# Patient Record
Sex: MALE | Race: WHITE | NOT HISPANIC OR LATINO | Employment: FULL TIME | URBAN - METROPOLITAN AREA
[De-identification: names, ages, dates, MRNs, and addresses within clinical notes are randomized per-mention and may not be internally consistent; named-entity substitution may affect disease eponyms.]

---

## 2019-09-24 ENCOUNTER — HOSPITAL ENCOUNTER (EMERGENCY)
Facility: MEDICAL CENTER | Age: 34
End: 2019-09-24
Attending: EMERGENCY MEDICINE
Payer: COMMERCIAL

## 2019-09-24 ENCOUNTER — APPOINTMENT (OUTPATIENT)
Dept: RADIOLOGY | Facility: MEDICAL CENTER | Age: 34
End: 2019-09-24
Attending: EMERGENCY MEDICINE
Payer: COMMERCIAL

## 2019-09-24 VITALS
HEART RATE: 64 BPM | RESPIRATION RATE: 18 BRPM | HEIGHT: 74 IN | OXYGEN SATURATION: 98 % | BODY MASS INDEX: 23.37 KG/M2 | TEMPERATURE: 97.5 F | WEIGHT: 182.1 LBS | DIASTOLIC BLOOD PRESSURE: 70 MMHG | SYSTOLIC BLOOD PRESSURE: 118 MMHG

## 2019-09-24 DIAGNOSIS — S09.90XA CLOSED HEAD INJURY, INITIAL ENCOUNTER: ICD-10-CM

## 2019-09-24 DIAGNOSIS — S02.40EA ZYGOMATIC FRACTURE, RIGHT SIDE, INITIAL ENCOUNTER FOR CLOSED FRACTURE (HCC): ICD-10-CM

## 2019-09-24 PROCEDURE — 70486 CT MAXILLOFACIAL W/O DYE: CPT

## 2019-09-24 PROCEDURE — A9270 NON-COVERED ITEM OR SERVICE: HCPCS | Performed by: EMERGENCY MEDICINE

## 2019-09-24 PROCEDURE — 99284 EMERGENCY DEPT VISIT MOD MDM: CPT

## 2019-09-24 PROCEDURE — 70450 CT HEAD/BRAIN W/O DYE: CPT

## 2019-09-24 PROCEDURE — 700102 HCHG RX REV CODE 250 W/ 637 OVERRIDE(OP): Performed by: EMERGENCY MEDICINE

## 2019-09-24 PROCEDURE — 72125 CT NECK SPINE W/O DYE: CPT

## 2019-09-24 PROCEDURE — 73080 X-RAY EXAM OF ELBOW: CPT | Mod: RT

## 2019-09-24 RX ORDER — LIDOCAINE HYDROCHLORIDE AND EPINEPHRINE 10; 10 MG/ML; UG/ML
10 INJECTION, SOLUTION INFILTRATION; PERINEURAL ONCE
Status: DISCONTINUED | OUTPATIENT
Start: 2019-09-24 | End: 2019-09-24 | Stop reason: HOSPADM

## 2019-09-24 RX ORDER — ACETAMINOPHEN 325 MG/1
650 TABLET ORAL ONCE
Status: COMPLETED | OUTPATIENT
Start: 2019-09-24 | End: 2019-09-24

## 2019-09-24 RX ADMIN — ACETAMINOPHEN 650 MG: 325 TABLET, FILM COATED ORAL at 13:45

## 2019-09-24 SDOH — HEALTH STABILITY: MENTAL HEALTH: HOW OFTEN DO YOU HAVE A DRINK CONTAINING ALCOHOL?: NEVER
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
## 2019-09-24 NOTE — DISCHARGE INSTRUCTIONS
Follow-up with Dr. Nato Castano, plastic/facial surgeon, this week.  Please call his office for appointment.    Return to the ER for any worsening headache, changing headache, dizziness/vertigo, nausea, vomiting, visual changes, numbness/tingling/weakness to arms or legs, a feeling of being off balance, lethargy, behavioral changes, or for any concerns.  Also return to the ER for any signs of wound infection (redness, swelling, drainage of pus, fevers, chills, or for any concerns.)    Please do not participate in activities that put your head at further risk until you have been seen and cleared by a primary care physician.  Please follow-up with Dr. Mcdonnell, primary care physician, within the next several days.  Please call today to schedule an appointment.    Follow up at Work Comp in the next 24 hours.
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
## 2019-09-24 NOTE — ED PROVIDER NOTES
ED Provider Note    Scribed for Em Casas M.D. by Laura Monterroso. 9/24/2019  12:55 PM    Primary care provider: None noted   Means of arrival: Walk-In  History obtained from: Patient   History limited by: None   CHIEF COMPLAINT  Chief Complaint   Patient presents with   • T-5000 Head Injury     pt was playing kick ball and fell into hydrant. positive LOC    • Head Laceration       HPI  Anmol Esquivel is a 35 y.o. male who presents for evaluation of injury sustained during a fall this morning while at work. Patient was playing a game of kickball with his coworkers while they were waiting for a load to be dropped off when he hit his head on a pole and loss consciousness as he was running for the ball.  He did not see that there was a pole in front of him and he ran right into it. He notes he does not remember what happened after he struck his head, but states witnesses saw him hit his head on the pole and immediately got knocked out.  Patient's wife reports that patient did not remember anything immediately after the accident when she came and dropped of his lunch.  However, he remembers the events leading up to the injury.  He endorses left sided neck pain, headache, and tingling in most finger tips.  He states the tingling in all of his fingertips developed while he was in triage.  He was fairly upset in triage and was tearful, worried that he might of put his job in jeopardy as this incident happened at work and this is a new job for him.  He denies any vision changes.  No vertigo.  No weakness of extremities.  No diplopia.  Patient is not anticoagulated.  Patient complains of pain to the right TMJ region.     REVIEW OF SYSTEMS  See HPI for further details.  Positive for headache, amnesia to the event, nausea, lacerations, left-sided neck pain.  Negative for numbness/weakness of extremities, vomiting, double vision, vertigo, difficulty walking, chest pain, abdominal pain, rib pain, bleeding tendency.  All  other systems are negative.    PAST MEDICAL HISTORY  History reviewed. No pertinent past medical history.    FAMILY HISTORY  History reviewed. No pertinent family history.    SOCIAL HISTORY  Social History     Socioeconomic History   • Marital status:      Spouse name: Not on file   • Number of children: Not on file   • Years of education: Not on file   • Highest education level: Not on file   Occupational History   • Not on file   Social Needs   • Financial resource strain: Not on file   • Food insecurity:     Worry: Not on file     Inability: Not on file   • Transportation needs:     Medical: Not on file     Non-medical: Not on file   Tobacco Use   • Smoking status: Never Smoker   • Smokeless tobacco: Never Used   Substance and Sexual Activity   • Alcohol use: Never     Frequency: Never   • Drug use: Yes     Comment: thc    • Sexual activity: Not on file   Lifestyle   • Physical activity:     Days per week: Not on file     Minutes per session: Not on file   • Stress: Not on file   Relationships   • Social connections:     Talks on phone: Not on file     Gets together: Not on file     Attends Restoration service: Not on file     Active member of club or organization: Not on file     Attends meetings of clubs or organizations: Not on file     Relationship status: Not on file   • Intimate partner violence:     Fear of current or ex partner: Not on file     Emotionally abused: Not on file     Physically abused: Not on file     Forced sexual activity: Not on file   Other Topics Concern   • Not on file   Social History Narrative   • Not on file       SURGICAL HISTORY  History reviewed. No pertinent surgical history.    CURRENT MEDICATIONS  Current Facility-Administered Medications:   •  acetaminophen (TYLENOL) tablet 650 mg, 650 mg, Oral, Once, Em Casas M.D.  No current outpatient medications on file.      ALLERGIES  No Known Allergies    PHYSICAL EXAM  VITAL SIGNS: /69   Pulse (!) 57   Temp 36.4 °C  "(97.5 °F) (Temporal)   Resp 16   Ht 1.88 m (6' 2\")   Wt 82.6 kg (182 lb 1.6 oz)   SpO2 98%   BMI 23.38 kg/m²    Constitutional: Well developed, well nourished; No acute distress; Non-toxic appearance.   HENT: Tenderness to right TMJ/right zygomatic area.. Normocephalic; Bilateral external ears normal; Oropharynx with moist mucous membranes; No erythema or exudates in the posterior oropharynx.  No septal hematoma.  No hemotympanum.  Eyes: PERRL, EOMI, Conjunctiva normal. No discharge.  No raccoons or huang's.  Neck:  No midline c-spine tenderness. Supple; No stridor; No nuchal rigidity.  Tenderness over the left paraspinous muscles and trapezius.  Lymphatic: No cervical lymphadenopathy noted.   Cardiovascular: Regular rate and rhythm without murmurs, rubs, or gallop.   Thorax & Lungs: No respiratory distress, breath sounds clear to auscultation bilaterally without wheezing, rales or rhonchi. Nontender chest wall. No crepitus or subcutaneous air  Skin: Small 0.5 cm well approximated laceration over right TMJ/zygomatic area; there is a small abrasion alongside the crease over right nasal ala; 1.5 cm, well approximated, partial-thickness laceration to the right forehead.  Good color; warm and dry without rash or petechia.  Back: Nontender T-spine and L-spine.  No step-off or deformity.  Extremities: Distal dorsalis pedis, posterior tibial pulses are equal bilaterally; No edema; Nontender calves or saphenous, No cyanosis, No clubbing.   Musculoskeletal: Tenderness over the right olecranon.  Able to flex and extend without any pain.  No pain with pronation/supination.  Good range of motion in all major joints. No major deformities noted.   Neurologic: Alert & oriented x 4, clear speech.      LABS/RADIOLOGY/PROCEDURES    DX-ELBOW-COMPLETE 3+ RIGHT   Final Result         No acute osseous abnormality.      CT-HEAD W/O   Final Result      1.  No evidence of acute intracranial process.      2.  Comminuted and impacted " "right zygomatic arch fracture.      CT-CSPINE WITHOUT PLUS RECONS   Final Result      No evidence of cervical spine fracture.      CT-MAXILLOFACIAL W/O PLUS RECONS   Final Result         1. Acute, segmental fracture of the right zygomatic arch. Slight depression of fracture fragments, with subcutaneous gas and laceration in this region.      2. Right periorbital laceration. No other maxillofacial fractures.          COURSE & MEDICAL DECISION MAKING  Pertinent Labs & Imaging studies reviewed. (See chart for details)    12:55 PM - Patient seen and examined at bedside. Discussed plan of care, including ruling out any emergent processes with radiology. Patient agrees to the plan of care. The patient will be medicated with acetaminophen 650 mg. Ordered for radiology to evaluate his symptoms.     3.:00 PM - Reviewed radiology results at this time.     3:16 PM - I discussed the patient's case and the above findings with Dr. Castano (Plastic Surgery) who discussed closing lacerations steri strips today in ER and he will see patient in his office at the end of this week for a follow-up.  He is aware that the laceration is overlying the zygomatic fracture.  He states that lacerations overlying facial fractures are not considered \"open fractures\" he does not recommend any antibiotics or any change in management other than outpatient follow-up as discussed previously.    3:18 PM - Patient was reevaluated at bedside. Discussed lab results with the patient and informed them that his radiology results indicated an acute, segmental fracture of the right zygomatic arch. Discussed closing lacerations with sutures or steri strips. Patient would like laceration to be closed with steri strips. Discussed potential concussion and advised patient to avoid any activity that would exacerbate his symptoms. Discussed  follow-up with Dr. Castano (Plastic Surgery) in one week for laceration and a PCP for concussion symptoms. Patient is advised to " return for any new symptoms, including moderate headaches, dizziness, or changes in vision. Treat facial pain with ice and over the counter analgesics and adjust his diet to decrease discomfort when eating.      Patient presents to the ER complaining of a few small lacerations to his face as well as some tenderness and pain to his right TMJ/zygomatic region after he accidentally smacked into a pole while he was running to catch a ball while playing football with a few of his friends at work.  He also complains of headache, nausea and some amnesia post head trauma.  The patient states that he was reported that when he struck the pole with his head, he was instantly knocked unconscious.  He is unconscious for several minutes.  By the time he woke up he had a butterfly bandage on his head, ice on his face, and cannot remember some of the events that occurred after his head injury.  He states he remembers everything up until his head injury.  Patient has a little bit of pain in the left paraspinous muscles of the cervical region and his trapezius.  There is no midline C-spine tenderness.  No step-off or deformity.  CT C-spine is negative.  CT head was performed given patient's LOC after trauma and symptoms of concussion.  The CT brain is negative.  Evidence for head bleed or skull fracture.  He does, however, have a comminuted, somewhat impacted and slightly displaced fracture of the right zygomatic arch.  There is a small 0.5 cm overlying laceration.  I spoke with Dr. Billy Castano, plastic surgeon/facial fracture surgeon on-call.  He states that this is not an open fracture.  They do not worry about lacerations overlying fractures in the face.  He does not need antibiotics.  He will see the patient in his office in later this week.  The patient is to call for an appointment.  Patient also complained of a little bit of elbow pain.  He had some mild tenderness over the olecranon.  The x-ray is negative.  There is no pain  with pronation, supination, flexion or extension.  There is no fat pad seen on x-ray.  At this time no concern for fracture, but since elbows often harbor occult fractures, I will place him in a sling.  He is to follow-up at work comp.  He is also to follow-up with Dr. Billy Castano, plastic/facial fracture surgeon on-call.  Patient has been given very strict return precautions and discharge instructions for facial fracture and head injury.  He understands if he gets worse in any way he must return to the ER immediately.  He understands he should not participate in any activity that put his head at further risk until he is cleared by primary care/work comp.  Patient understands treatment plan and follow-up.      The patient will return for new or worsening symptoms and is stable at the time of discharge.    DISPOSITION:  Patient will be discharged home in stable condition.    FOLLOW UP:  Nato Castano Jr., M.D.  [address]  [place] [address]  [phone]    In 2 days  If symptoms worsen return to ER    Ronald Mcdonnell D.O.  [address]  [place] [address]  [phone]    Schedule an appointment as soon as possible for a visit in 2 days  If symptoms worsen return to ER      OUTPATIENT MEDICATIONS:  New Prescriptions    No medications on file         FINAL IMPRESSION  1. Closed head injury, initial encounter    2. Zygomatic fracture, right side, initial encounter for closed fracture (HCC)       This dictation has been created using voice recognition software. The accuracy of the dictation is limited by the abilities of the software. I expect there may be some errors of grammar and possibly content. I made every attempt to manually correct the errors within my dictation. However, errors related to voice recognition software may still exist and should be interpreted within the appropriate context.       I, Laura Monterroso (Mayra), am scribing for, and in the presence of, Em Casas,  M.D..    Electronically signed by: Laura Monterroso (Scribe), 9/24/2019    IEm M.D. personally performed the services described in this documentation, as scribed by Laura Monterroso in my presence, and it is both accurate and complete. C.     The note accurately reflects work and decisions made by me.  Em Casas  9/24/2019  4:40 PM
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
## 2019-10-08 ENCOUNTER — OCCUPATIONAL MEDICINE (OUTPATIENT)
Dept: URGENT CARE | Facility: CLINIC | Age: 34
End: 2019-10-08
Payer: COMMERCIAL

## 2019-10-08 VITALS
WEIGHT: 176 LBS | OXYGEN SATURATION: 95 % | DIASTOLIC BLOOD PRESSURE: 68 MMHG | BODY MASS INDEX: 22.59 KG/M2 | TEMPERATURE: 97.5 F | RESPIRATION RATE: 16 BRPM | HEIGHT: 74 IN | HEART RATE: 60 BPM | SYSTOLIC BLOOD PRESSURE: 126 MMHG

## 2019-10-08 DIAGNOSIS — S02.40ED CLOSED FRACTURE OF RIGHT ZYGOMATIC ARCH WITH ROUTINE HEALING, SUBSEQUENT ENCOUNTER: ICD-10-CM

## 2019-10-08 PROCEDURE — 99203 OFFICE O/P NEW LOW 30 MIN: CPT | Performed by: FAMILY MEDICINE
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
